# Patient Record
Sex: MALE | Race: BLACK OR AFRICAN AMERICAN | Employment: FULL TIME | URBAN - METROPOLITAN AREA
[De-identification: names, ages, dates, MRNs, and addresses within clinical notes are randomized per-mention and may not be internally consistent; named-entity substitution may affect disease eponyms.]

---

## 2021-06-04 ENCOUNTER — HOSPITAL ENCOUNTER (EMERGENCY)
Age: 36
Discharge: HOME | End: 2021-06-04
Attending: EMERGENCY MEDICINE

## 2021-06-04 VITALS
HEIGHT: 70 IN | DIASTOLIC BLOOD PRESSURE: 92 MMHG | BODY MASS INDEX: 45.1 KG/M2 | OXYGEN SATURATION: 96 % | HEART RATE: 98 BPM | WEIGHT: 315 LBS | RESPIRATION RATE: 16 BRPM | SYSTOLIC BLOOD PRESSURE: 165 MMHG | TEMPERATURE: 97.9 F

## 2021-06-04 DIAGNOSIS — N48.1 BALANITIS: Primary | ICD-10-CM

## 2021-06-04 DIAGNOSIS — E13.9 OTHER SPECIFIED DIABETES MELLITUS WITHOUT COMPLICATION, WITHOUT LONG-TERM CURRENT USE OF INSULIN (HCC): ICD-10-CM

## 2021-06-04 LAB
ANION GAP SERPL CALCULATED.3IONS-SCNC: 14 MMOL/L (ref 3–16)
BUN BLDV-MCNC: 8 MG/DL (ref 7–20)
CALCIUM SERPL-MCNC: 9.5 MG/DL (ref 8.3–10.6)
CHLORIDE BLD-SCNC: 95 MMOL/L (ref 99–110)
CO2: 22 MMOL/L (ref 21–32)
CREAT SERPL-MCNC: 0.7 MG/DL (ref 0.9–1.3)
GFR AFRICAN AMERICAN: >60
GFR NON-AFRICAN AMERICAN: >60
GLUCOSE BLD-MCNC: 548 MG/DL (ref 70–99)
POTASSIUM REFLEX MAGNESIUM: 4.9 MMOL/L (ref 3.5–5.1)
SODIUM BLD-SCNC: 131 MMOL/L (ref 136–145)

## 2021-06-04 PROCEDURE — 80048 BASIC METABOLIC PNL TOTAL CA: CPT

## 2021-06-04 PROCEDURE — 99283 EMERGENCY DEPT VISIT LOW MDM: CPT

## 2021-06-04 PROCEDURE — 36415 COLL VENOUS BLD VENIPUNCTURE: CPT

## 2021-06-04 RX ORDER — CLOTRIMAZOLE 1 %
CREAM (GRAM) TOPICAL
Qty: 1 TUBE | Refills: 1 | Status: SHIPPED | OUTPATIENT
Start: 2021-06-04 | End: 2021-06-11

## 2021-06-04 ASSESSMENT — PAIN DESCRIPTION - FREQUENCY: FREQUENCY: INTERMITTENT

## 2021-06-04 ASSESSMENT — ENCOUNTER SYMPTOMS
NAUSEA: 0
COUGH: 0
ABDOMINAL PAIN: 0
VOMITING: 0
BACK PAIN: 0
SHORTNESS OF BREATH: 0

## 2021-06-04 ASSESSMENT — PAIN DESCRIPTION - ONSET: ONSET: GRADUAL

## 2021-06-04 ASSESSMENT — PAIN DESCRIPTION - PAIN TYPE: TYPE: ACUTE PAIN

## 2021-06-04 ASSESSMENT — PAIN DESCRIPTION - DESCRIPTORS: DESCRIPTORS: BURNING

## 2021-06-04 ASSESSMENT — PAIN DESCRIPTION - LOCATION: LOCATION: PENIS;GROIN

## 2021-06-04 ASSESSMENT — PAIN SCALES - GENERAL: PAINLEVEL_OUTOF10: 2

## 2021-06-04 NOTE — ED PROVIDER NOTES
[address] [facility] [address] ENCOUNTER          PHYSICIAN ASSISTANT NOTE       Date of evaluation: 6/4/2021    Chief Complaint     Groin Swelling (Swelling in foreskin from skin irritation )      History of Present Illness     Elpidio Gay is a 43 y.o. male who presents to the emergency department due to penile discomfort. Patient states over the last 2 to 3 weeks he has had swelling noted throughout the distal portion of his foreskin. He states initially the swelling was significant, however it is much improved. He states he has been using antibacterial soap twice daily and triple antibiotic ointment for cracks in his skin. He presented to the emergency room today because he is unable to get all the swelling to go away. He is able to retract his foreskin fully without significant difficulty. He states prior to the swelling he was sexually active with his wife. Denies penile discharge, testicular pain, testicular swelling, or any concern for sexually transmitted infection. He denies abdominal pain, nausea, vomiting, or fevers. Patient states that he was told he was prediabetic several years ago, however has not followed up with his primary care provider in approximately 2 years. Review of Systems     Review of Systems   Constitutional: Negative for activity change, chills and fever. HENT: Negative for congestion. Eyes: Negative for visual disturbance. Respiratory: Negative for cough and shortness of breath. Cardiovascular: Negative for chest pain. Gastrointestinal: Negative for abdominal pain, nausea and vomiting. Genitourinary: Positive for penile pain and penile swelling. Negative for difficulty urinating, discharge, scrotal swelling and testicular pain. Musculoskeletal: Negative for back pain. Neurological: Negative for headaches. Psychiatric/Behavioral: Negative for suicidal ideas.        Past Medical, Surgical, Family, and Social History     He has a past medical history of Asthma. He has a past surgical history that includes other surgical history (3/17/2015) and Tonsillectomy. His family history is not on file. He reports that he has been smoking. He does not have any smokeless tobacco history on file. He reports current alcohol use of about 3.0 standard drinks of alcohol per week. He reports current drug use. Drug: Marijuana. Medications     Previous Medications    ACETAMINOPHEN (TYLENOL) 325 MG TABLET    Take 650 mg by mouth every 6 hours as needed for Pain. Allergies     He has No Known Allergies. Physical Exam     INITIAL VITALS: BP: (!) 165/92, Temp: 97.9 °F (36.6 °C), Pulse: 98, Resp: 16, SpO2: 96 %  Physical Exam  Constitutional:       Appearance: Normal appearance. HENT:      Head: Normocephalic and atraumatic. Eyes:      Pupils: Pupils are equal, round, and reactive to light. Cardiovascular:      Rate and Rhythm: Normal rate and regular rhythm. Pulmonary:      Effort: Pulmonary effort is normal.      Breath sounds: Normal breath sounds. Abdominal:      General: There is no distension. Palpations: Abdomen is soft. Tenderness: There is no abdominal tenderness. Genitourinary:     Comments:  exam performed with Dr. Zee Hunter as a chaperone. Patient does have swelling noted throughout the distal foreskin and slumped to the glans of penis. He is able to retract the foreskin without difficulty. No signs of phimosis or paraphimosis. Patient does have some white, curd-like lesions. No penile discharge. No testicular tenderness or swelling. Musculoskeletal:         General: Normal range of motion. Cervical back: Normal range of motion and neck supple. Skin:     General: Skin is warm and dry. Neurological:      General: No focal deficit present. Mental Status: He is alert and oriented to person, place, and time.    Psychiatric:         Mood and Affect: Mood normal.         Behavior: Behavior normal. Diagnostic Results     RADIOLOGY:  No orders to display       LABS:   Results for orders placed or performed during the hospital encounter of 65/10/72   Basic Metabolic Panel w/ Reflex to MG   Result Value Ref Range    Sodium 131 (L) 136 - 145 mmol/L    Potassium reflex Magnesium 4.9 3.5 - 5.1 mmol/L    Chloride 95 (L) 99 - 110 mmol/L    CO2 22 21 - 32 mmol/L    Anion Gap 14 3 - 16    Glucose 548 (H) 70 - 99 mg/dL    BUN 8 7 - 20 mg/dL    CREATININE 0.7 (L) 0.9 - 1.3 mg/dL    GFR Non-African American >60 >60    GFR African American >60 >60    Calcium 9.5 8.3 - 10.6 mg/dL       RECENT VITALS:  BP: (!) 165/92, Temp: 97.9 °F (36.6 °C), Pulse: 98, Resp: 16, SpO2: 96 %       ED Course     Nursing Notes, Past Medical Hx,Past Surgical Hx, Social Hx, Allergies, and Family Hx were reviewed. The patient was given the following medications:  Orders Placed This Encounter   Medications    metFORMIN (GLUCOPHAGE) 500 MG tablet     Sig: Take 1 tablet by mouth 2 times daily (with meals)     Dispense:  60 tablet     Refill:  0    clotrimazole (LOTRIMIN) 1 % cream     Sig: Apply topically 2 times daily to lesions on skin. Dispense:  1 Tube     Refill:  1       CONSULTS:  None    MEDICAL DECISION MAKING / ASSESSMENT / Ashanti Saavedra is a 43 y.o. male who presents the emergency department with penile pain. Vital signs stable on presentation remained stable throughout his stay. Thorough history and physical exam was performed in detail above. Patient presents the emergency department with several weeks of penile pain and swelling. He states his symptoms have improved, however he is unable to get the swelling to fully go away. He states he has been using antibiotic soap twice daily along with triple antibiotic ointment for skin breaks. He was sexually active with his partner prior to symptom onset, however has not been since.   Denies penile discharge, testicular pain, testicular swelling, or any concern for sexually transmitted infection. Denies abdominal pain, nausea, vomiting, or fevers. On physical exam patient does have swelling noted throughout the distal portion of his foreskin and some to the glans of his penis. Several small curd-like areas noted. Patient is able to retract his foreskin without difficulty. No signs of phimosis or paraphimosis. No testicular tenderness palpation. No penile discharge. BMP was obtained due to concern for diabetes which showed a significantly elevated blood glucose to 548. Anion gap within normal limits. No other significant abnormalities. Low suspicion for DKA or other emergent problem at this time. I did have a long discussion with the patient about his new diagnosis of diabetes. He will be started on Metformin 500mg BID and given an urgent referral to the [facility] medical [facility]. Patient was counseled on healthy diet and lifestyle choices. At this time, I do believe patient likely has a candidal infection causing his swelling and irritation. He will be given a prescription for Clotrimazole 1% to use twice daily along with urology follow-up. Patient was also given contact information for the [address] [place] [address] clinic as he has not been able to follow-up with his primary care provider in several years. Plan was thoroughly discussed with the patient who is agreeable at this time. He was given strict return precautions prior to discharge. This patient was also evaluated by the attending physician. All care plans were discussed and agreed upon. Clinical Impression     1. Balanitis    2.  Other specified diabetes mellitus without complication, without long-term current use of insulin [facility])        Disposition     PATIENT REFERRED TO:  [facility] Emergency Department  [address] [place] [address]  [phone]  Go to   If symptoms worsen    Lani Narayanan MD  [address] [place] [address]  [phone]    Schedule an appointment as soon as possible for a visit         DISCHARGE MEDICATIONS:  New Prescriptions    CLOTRIMAZOLE (LOTRIMIN) 1 % CREAM    Apply topically 2 times daily to lesions on skin.     METFORMIN (GLUCOPHAGE) 500 MG TABLET    Take 1 tablet by mouth 2 times daily (with meals)       DISPOSITION  discharge          Niharika Welsh PA-C  06/04/21 1037

## 2021-06-04 NOTE — ED PROVIDER NOTES
ED Attending Attestation Note     Date of evaluation: 6/4/2021    This patient was seen by the advance practice provider. I have seen and examined the patient, agree with the workup, evaluation, management and diagnosis. The care plan has been discussed. My assessment reveals presents to the ED because of penile pain. Patient is uncircumcised. He does have soft tissue edema of foreskin and glans. He does not have a phimosis or paraphimosis. Able to retract glans from foreskin without difficulty but does have discomfort and some mild soft tissue swelling. Questionable history of diabetes. Plan is to check his blood sugar and electrolytes in treat with antifungals refer to urology.      Uyen Barrios MD  06/04/21 2374

## 2021-09-05 ENCOUNTER — HOSPITAL ENCOUNTER (EMERGENCY)
Age: 36
Discharge: HOME | End: 2021-09-05

## 2021-09-05 VITALS
OXYGEN SATURATION: 97 % | TEMPERATURE: 97.3 F | HEART RATE: 82 BPM | RESPIRATION RATE: 20 BRPM | DIASTOLIC BLOOD PRESSURE: 88 MMHG | SYSTOLIC BLOOD PRESSURE: 141 MMHG

## 2021-09-05 DIAGNOSIS — N48.89 PENILE IRRITATION: Primary | ICD-10-CM

## 2021-09-05 DIAGNOSIS — E11.9 TYPE 2 DIABETES MELLITUS WITHOUT COMPLICATION, WITHOUT LONG-TERM CURRENT USE OF INSULIN (HCC): ICD-10-CM

## 2021-09-05 LAB
GLUCOSE BLD-MCNC: 310 MG/DL (ref 70–99)
PERFORMED ON: ABNORMAL

## 2021-09-05 PROCEDURE — 99282 EMERGENCY DEPT VISIT SF MDM: CPT

## 2021-09-05 RX ORDER — BACITRACIN ZINC AND POLYMYXIN B SULFATE 500; 1000 [USP'U]/G; [USP'U]/G
OINTMENT TOPICAL
Qty: 15 G | Refills: 1 | Status: SHIPPED | OUTPATIENT
Start: 2021-09-05 | End: 2021-09-12

## 2021-09-05 ASSESSMENT — PAIN DESCRIPTION - LOCATION: LOCATION: PENIS

## 2021-09-05 ASSESSMENT — PAIN SCALES - GENERAL: PAINLEVEL_OUTOF10: 3

## 2021-09-05 ASSESSMENT — PAIN DESCRIPTION - ONSET: ONSET: ON-GOING

## 2021-09-05 ASSESSMENT — ENCOUNTER SYMPTOMS
CHEST TIGHTNESS: 0
VOMITING: 0
NAUSEA: 0
DIARRHEA: 0
SHORTNESS OF BREATH: 0
ABDOMINAL PAIN: 0
CONSTIPATION: 0

## 2021-09-05 ASSESSMENT — PAIN - FUNCTIONAL ASSESSMENT: PAIN_FUNCTIONAL_ASSESSMENT: PREVENTS OR INTERFERES WITH MANY ACTIVE NOT PASSIVE ACTIVITIES

## 2021-09-05 ASSESSMENT — PAIN DESCRIPTION - FREQUENCY: FREQUENCY: CONTINUOUS

## 2021-09-05 ASSESSMENT — PAIN DESCRIPTION - PAIN TYPE: TYPE: ACUTE PAIN

## 2021-09-05 ASSESSMENT — PAIN SCALES - WONG BAKER: WONGBAKER_NUMERICALRESPONSE: 4

## 2021-09-05 ASSESSMENT — PAIN DESCRIPTION - DESCRIPTORS: DESCRIPTORS: CONSTANT

## 2021-09-05 ASSESSMENT — PAIN DESCRIPTION - PROGRESSION: CLINICAL_PROGRESSION: NOT CHANGED

## 2021-09-05 NOTE — ED PROVIDER NOTES
[address] [facility] ENC[facility]          PHYSICIAN ASSISTANT NOTE       Date of evaluation: 9/5/2021    Chief Complaint     Wound Check (areas on penis keep breaking open)      History of Present Illness     Lucía Farfan is a 43 y.o. male who presents to the emergency department with excoriations to the penile foreskin. The patient is uncircumcised and states that he has been battling with excoriations to the skin for the last few months. He was seen here in June secondary to a candidal infection and prescribed fungal cream at that time. He states that has improved however he continues to have excoriations to the foreskin. He states he has been using antibiotic ointment and the areas will heal however once dry they crack again. At the time of his last visit he was prescribed Metformin which he took until the prescription ran out and has not taken any medication for diabetes since. He is a known diabetic, noncompliant and currently does not have a primary care physician. He denies fevers or chills. Denies abdominal pain, nausea or vomiting. Denies dysuria, hematuria, urinary frequency, penile discharge, testicular pain or swelling. Review of Systems     Review of Systems   Constitutional: Negative for chills and fever. HENT: Negative. Respiratory: Negative for chest tightness and shortness of breath. Cardiovascular: Negative. Gastrointestinal: Negative for abdominal pain, constipation, diarrhea, nausea and vomiting. Genitourinary: Positive for penile pain. Negative for decreased urine volume, difficulty urinating, discharge, dysuria, flank pain, frequency, hematuria, penile swelling, scrotal swelling, testicular pain and urgency. Musculoskeletal: Negative. Skin: Positive for wound. Positive for penile excoriations     Neurological: Negative. Negative for dizziness, weakness, light-headedness and headaches. Psychiatric/Behavioral: Negative. Past Medical, Surgical, Family, and Social History     He has a past medical history of Asthma and Diabetes mellitus ([facility] He has a past surgical history that includes other surgical history (3/17/2015) and Tonsillectomy. His family history is not on file. He reports that he has been smoking. He has never used smokeless tobacco. He reports current alcohol use of about 3.0 standard drinks of alcohol per week. He reports current drug use. Drug: Marijuana. Medications     Previous Medications    ACETAMINOPHEN (TYLENOL) 325 MG TABLET    Take 650 mg by mouth every 6 hours as needed for Pain. Allergies     He has No Known Allergies. Physical Exam     INITIAL VITALS: BP: (!) 141/88, Temp: 97.3 °F (36.3 °C), Pulse: 82, Resp: 20, SpO2: 97 %  Physical Exam  Vitals and nursing note reviewed. Constitutional:       General: He is not in acute distress. Appearance: He is well-developed. HENT:      Head: Normocephalic and atraumatic. Right Ear: External ear normal.      Left Ear: External ear normal.      Nose: Nose normal.      Mouth/Throat:      Mouth: Mucous membranes are moist.   Eyes:      General:         Right eye: No discharge. Left eye: No discharge. Extraocular Movements: Extraocular movements intact. Conjunctiva/sclera: Conjunctivae normal.      Pupils: Pupils are equal, round, and reactive to light. Pulmonary:      Effort: Pulmonary effort is normal.   Abdominal:      General: Bowel sounds are normal.      Palpations: Abdomen is soft. Tenderness: There is no abdominal tenderness. There is no guarding or rebound. Genitourinary:     Comments: On examination he is an uncircumcised male with no discharge from the urethral meatus. There is no evidence of candidal infection after retracting the foreskin. He does however have some excoriations to the foreskin. There is no bleeding. No surrounding erythema or purulent drainage.   The foreskin itself is somewhat moist.  No testicular pain or swelling on exam.  No inguinal lymphadenopathy. Musculoskeletal:         General: Normal range of motion. Cervical back: Normal range of motion and neck supple. Skin:     General: Skin is warm and dry. Capillary Refill: Capillary refill takes less than 2 seconds. Neurological:      General: No focal deficit present. Mental Status: He is alert and oriented to person, place, and time. Sensory: No sensory deficit. Deep Tendon Reflexes: Reflexes normal.   Psychiatric:         Mood and Affect: Mood normal.         Behavior: Behavior normal.         Thought Content: Thought content normal.         Judgment: Judgment normal.         Diagnostic Results       RADIOLOGY:  No orders to display       LABS:   Results for orders placed or performed during the hospital encounter of 09/05/21   POCT Glucose   Result Value Ref Range    POC Glucose 310 (H) 70 - 99 mg/dl    Performed on ACCU-CHEK            RECENT VITALS:  BP: (!) 141/88, Temp: 97.3 °F (36.3 °C), Pulse: 82, Resp: 20, SpO2: 97 %     Procedures         ED Course     Nursing Notes, Past Medical Hx,Past Surgical Hx, Social Hx, Allergies, and Family Hx were reviewed. The patient was given the following medications:  Orders Placed This Encounter   Medications    metFORMIN (GLUCOPHAGE) 500 MG tablet     Sig: Take 1 tablet by mouth 2 times daily (with meals)     Dispense:  60 tablet     Refill:  1    bacitracin-polymyxin b (POLYSPORIN) [id number] UNIT/GM ointment     Sig: Apply topically 2 times daily. Dispense:  15 g     Refill:  1       CONSULTS:  None    MEDICAL DECISION MAKING / ASSESSMENT / Susan Olesya is a 43 y.o. male who presented to the emergency department with excoriations to the foreskin of the penis. On examination there is no evidence of secondary infection. No evidence of remaining candidal infection.   The skin gain continues to crack most likely because it is staying too moist.  The patient was encouraged to keep the area clean but also keep it dry. He can continue to apply an antibiotic ointment. His glucose here was 310. He denies any DKA type symptoms. He will be given a prescription for his Metformin with 1 refill as well as a referral to the [facility] clinic. He was given the number to urology as well and asked to follow-up if this is been an ongoing issue. I did have a long discussion with the patient that this may continue to happen and not heal if his glucose continues to remain high from noncompliance. He is to return to the emergency department for worsening symptoms or concerns. This patient was seen independently by the advanced practice provider    Clinical Impression     1. Penile irritation    2. Type 2 diabetes mellitus without complication, without long-term current use of insulin [facility])        Disposition     PATIENT REFERRED TO:  [facility] [address] [facility] MD Gavi Alfaro 1967  [facility]  [facility] [id number] [phone]            DISCHARGE MEDICATIONS:  New Prescriptions    BACITRACIN-POLYMYXIN B (POLYSPORIN) [id number] UNIT/GM OINTMENT    Apply topically 2 times daily.        DISPOSITION Decision To Discharge 09/05/2021 09:41:22 AM     REMEDIOS Das  09/05/21 1010

## 2021-09-08 ENCOUNTER — NURSE TRIAGE (OUTPATIENT)
Dept: OTHER | Facility: CLINIC | Age: 36
End: 2021-09-08

## 2021-09-08 NOTE — TELEPHONE ENCOUNTER
Received call from Isaac at [facility] with Red Flag Complaint. Brief description of triage: no triage completed. Calling for appt after ED visit today. Triage indicates for patient to no triage completed. Care advice provided, patient verbalizes understanding; denies any other questions or concerns; instructed to call back for any new or worsening symptoms. Writer provided warm transfer to [facility] at [facility] for appointment scheduling. Attention Provider: Thank you for allowing me to participate in the care of your patient. The patient was connected to triage in response to information provided to the ECC. Please do not respond through this encounter as the response is not directed to a shared pool. Reason for Disposition   Caller requesting an appointment, triage offered and declined    Answer Assessment - Initial Assessment Questions  1. REASON FOR CALL or QUESTION: \"What is your reason for calling today? \" or \"How can I best  help you? \" or \"What question do you have that I can help answer? \"     Caller just got out of ED and wants to schedule follow up appointment    2. CALLER: Document the source of call. (e.g., laboratory, patient).       patient    Protocols used: PCP CALL - NO TRIAGE-ADULT-

## 2021-09-09 ENCOUNTER — OFFICE VISIT (OUTPATIENT)
Dept: INTERNAL MEDICINE CLINIC | Age: 36
End: 2021-09-09

## 2021-09-09 VITALS
HEIGHT: 74 IN | OXYGEN SATURATION: 97 % | BODY MASS INDEX: 40.43 KG/M2 | SYSTOLIC BLOOD PRESSURE: 109 MMHG | DIASTOLIC BLOOD PRESSURE: 75 MMHG | WEIGHT: 315 LBS | TEMPERATURE: 98 F | HEART RATE: 80 BPM

## 2021-09-09 DIAGNOSIS — E11.9 TYPE 2 DIABETES MELLITUS WITHOUT COMPLICATION, WITHOUT LONG-TERM CURRENT USE OF INSULIN (HCC): Primary | ICD-10-CM

## 2021-09-09 DIAGNOSIS — N48.9 PENILE LESION: ICD-10-CM

## 2021-09-09 DIAGNOSIS — E66.01 CLASS 3 SEVERE OBESITY DUE TO EXCESS CALORIES WITH BODY MASS INDEX (BMI) OF 40.0 TO 44.9 IN ADULT, UNSPECIFIED WHETHER SERIOUS COMORBIDITY PRESENT (HCC): ICD-10-CM

## 2021-09-09 LAB — HBA1C MFR BLD: 13.1 %

## 2021-09-09 PROCEDURE — 83036 HEMOGLOBIN GLYCOSYLATED A1C: CPT

## 2021-09-09 PROCEDURE — 99213 OFFICE O/P EST LOW 20 MIN: CPT | Performed by: STUDENT IN AN ORGANIZED HEALTH CARE EDUCATION/TRAINING PROGRAM

## 2021-09-09 ASSESSMENT — ENCOUNTER SYMPTOMS
CONSTIPATION: 0
DIARRHEA: 0
ABDOMINAL PAIN: 0
BACK PAIN: 1
SHORTNESS OF BREATH: 0

## 2021-09-09 ASSESSMENT — PATIENT HEALTH QUESTIONNAIRE - PHQ9
SUM OF ALL RESPONSES TO PHQ QUESTIONS 1-9: 0
SUM OF ALL RESPONSES TO PHQ9 QUESTIONS 1 & 2: 0
SUM OF ALL RESPONSES TO PHQ QUESTIONS 1-9: 0
SUM OF ALL RESPONSES TO PHQ QUESTIONS 1-9: 0
1. LITTLE INTEREST OR PLEASURE IN DOING THINGS: 0
2. FEELING DOWN, DEPRESSED OR HOPELESS: 0

## 2021-09-09 NOTE — PATIENT INSTRUCTIONS
You have diabetes and your HgA1c is elevated to 13  - start taking metformin 1000 mg twice a day with meals   - apply miconazole to penile lesions   - apply to insurance with welfare    - get urinalysis done to check for protein or infection   - try make lifestyle changes; including eating less carbs (potatoes, pizza, bread) and exercise daily   - get [facility] give your diabetes    - f/u in 1 month

## 2021-09-09 NOTE — PROGRESS NOTES
times daily. Yes Princess To MD   bacitracin-polymyxin b (POLYSPORIN) [id number] UNIT/GM ointment Apply topically 2 times daily. Yes Eve Gomez, [address]       Allergies:    Patient has no known allergies. Family History:   No family history on file. Review of Systems   Constitutional: Negative for fatigue and fever. Respiratory: Negative for shortness of breath. Cardiovascular: Negative for chest pain, palpitations and leg swelling. Gastrointestinal: Negative for abdominal pain, constipation and diarrhea. Endocrine: Positive for polydipsia and polyuria. Genitourinary: Positive for genital sores. Negative for discharge, dysuria, penile pain, penile swelling and testicular pain. Musculoskeletal: Positive for back pain. A 10-organ Review Of Systems was obtained and otherwise unremarkable except as per HPI. There is no immunization history on file for this patient. Health Maintenance Due   Topic Date Due    Hepatitis C screen  Never done    Varicella vaccine (1 of 2 - 2-dose childhood series) Never done    Pneumococcal 0-64 years Vaccine (1 of 2 - PPSV23) Never done    COVID-19 Vaccine (1) Never done    HIV screen  Never done    Hepatitis B vaccine (1 of 3 - Risk 3-dose series) Never done    Flu vaccine (1) Never done       Data: Old records have been reviewed electronically. PHYSICAL EXAM:  /75 (Site: Right Upper Arm, Position: Sitting, Cuff Size: Medium Adult)   Pulse 80   Temp 98 °F (36.7 °C) (Temporal)   Ht 6' 2\" (1.88 m)   Wt (!) 332 lb 12.8 oz (151 kg)   SpO2 97%   BMI 42.73 kg/m²   Physical Exam  Constitutional:       Appearance: He is obese. HENT:      Mouth/Throat:      Mouth: Mucous membranes are moist.   Cardiovascular:      Rate and Rhythm: Normal rate and regular rhythm. Pulmonary:      Effort: Pulmonary effort is normal. No respiratory distress. Breath sounds: Normal breath sounds. No wheezing. Abdominal:      General: There is no distension. Palpations: Abdomen is soft. Tenderness: There is no abdominal tenderness. Musculoskeletal:      Right lower leg: No edema. Left lower leg: No edema. Neurological:      Mental Status: He is alert and oriented to person, place, and time. Assessment & Plan:      1. Type 2 diabetes mellitus without complication, without long-term current use of insulin (HCC)   in ED  - increased metformin to 100 mg BID   - discussed the diagnosis of diabetes and the risks associated with untreated diabetes and uncontrolled hyperglycemia with patient. Patient unwilling to use insulin or injectable medications. He does not want to try other oral hypoglycemics. - URINALYSIS WITH MICROSCOPIC to check for proteinuria  - POCT glycosylated hemoglobin (Hb A1C); 13.1%  - encouraged to apply for medicaid and go to welfare office if needs help with application. Provided good rx card to help with medication costs   - f/u in 1 month   - will readdress need for insulin use at next visit      2. Penile lesion  - continue with current therapy   - will provide miconazole cream in case of recurrence of fungal infection  - UA to r/o UTI    - discussed genial hygiene and keeping area dry      3. Class 3 severe obesity due to excess calories with body mass index (BMI) of 40.0 to 44.9 in adult, unspecified whether serious comorbidity present [facility])  - extensively discussed lifecycle modifications [facility] patient including healthy diet and exercise     Return in about 1 month (around 10/9/2021) for diabetes. Dispo: Pt has been staffed with Dr. Regino Sinha  _______________  Lilliana Mckee MD, 9/9/2021 4:02 PM   PGY-2    Addendum to Resident H& P/Progress note:  I have personally seen,examined and evaluated the patient.  I have reviewed the current history, physical findings, labs and assessment and plan and agree with note as documented by resident MD ( Ariadna Velazquez)      Angelica Avila MD, Mar Brantley

## 2021-10-18 ENCOUNTER — OFFICE VISIT (OUTPATIENT)
Dept: INTERNAL MEDICINE CLINIC | Age: 36
End: 2021-10-18

## 2021-10-18 VITALS
HEART RATE: 91 BPM | TEMPERATURE: 96.6 F | DIASTOLIC BLOOD PRESSURE: 86 MMHG | WEIGHT: 315 LBS | OXYGEN SATURATION: 97 % | SYSTOLIC BLOOD PRESSURE: 134 MMHG | BODY MASS INDEX: 40.43 KG/M2 | HEIGHT: 74 IN

## 2021-10-18 DIAGNOSIS — E66.01 MORBID OBESITY WITH BMI OF 45.0-49.9, ADULT (HCC): ICD-10-CM

## 2021-10-18 DIAGNOSIS — E11.9 TYPE 2 DIABETES MELLITUS WITHOUT COMPLICATION, WITHOUT LONG-TERM CURRENT USE OF INSULIN (HCC): Primary | ICD-10-CM

## 2021-10-18 PROCEDURE — 99213 OFFICE O/P EST LOW 20 MIN: CPT | Performed by: STUDENT IN AN ORGANIZED HEALTH CARE EDUCATION/TRAINING PROGRAM

## 2021-10-18 NOTE — PROGRESS NOTES
Outpatient Clinic Visit Note    Patient: Tiffanie Mckeon  : 1985 (43 y.o.)  Date: 10/18/2021    CC: DM F/u    HPI:      Mr. Radha Carcamo presents today for follow up of his diabetes mellitus. He has not been compliant with diet or medications due to stress from his previous miscarriage, death of his grandmother. Last A1c was 13.1% last month. He has not been taking his metformin or checking blood sugar at home. He is overwhelmed by everything else going on in his life at the moment. He is not interested in taking diabetic testing supplies home today. He is not interested in signing up for the diabetes education class. He feels that this is an insurmountable problem that will not ever change. He overall seems depressed, but denies suicidal ideation. He is not interested in taking medication for anxiety or depression and claims it would probably make him worse. I am very concerned for his prognosis over the next few years. His interpretation of the diabetes problems as unfixable will likely render it so. All resources were printed for the patient on how to apply for insurance with SSI, as this was quoted as a reason for non-adherence to medications at the previous visit. He was also given a referral to [facility] for his obesity, though I suspect it is unlikely he jose follow up. He should RTC in 4 weeks. Denies any fevers, chills, chest pain, palpitations, leg swelling, cough, shortness of breath, difficulty breathing, wheezing, abdominal pain, nausea, vomiting, diarrhea.       Past Medical History:    Past Medical History:   Diagnosis Date    Asthma     Diabetes mellitus ([facility] [address]        Past Surgical History:  Past Surgical History:   Procedure Laterality Date    OTHER SURGICAL HISTORY  3/17/2015    INCISION AND DRAINAGE SCROTAL ABSCESS     TONSILLECTOMY         Past Social History:  Social History     Socioeconomic History    Marital status: Single     Spouse name: Not on file    Number of children: Not on file    Years of education: Not on file    Highest education level: Not on file   Occupational History    Not on file   Tobacco Use    Smoking status: Current Some Day Smoker    Smokeless tobacco: Never Used   Substance and Sexual Activity    Alcohol use: Yes     Alcohol/week: 3.0 standard drinks     Types: 3 Standard drinks or equivalent per week     Comment: socially    Drug use: Yes     Types: Marijuana    Sexual activity: Yes     Partners: Female   Other Topics Concern    Not on file   Social History Narrative    Not on file     Social Determinants of Health     Financial Resource Strain:     Difficulty of Paying Living Expenses:    Food Insecurity:     Worried About Running Out of Food in the Last Year:     [address] in the Last Year:    Transportation Needs:     Lack of Transportation (Medical):  Lack of Transportation (Non-Medical):    Physical Activity:     Days of Exercise per Week:     Minutes of Exercise per Session:    Stress:     Feeling of Stress :    Social Connections:     Frequency of Communication with Friends and Family:     Frequency of Social Gatherings with Friends and Family:     Attends Jainism Services:     Active Member of Clubs or Organizations:     Attends Club or Organization Meetings:     Marital Status:    Intimate Partner Violence:     Fear of Current or Ex-Partner:     Emotionally Abused:     Physically Abused:     Sexually Abused:        Home Medications:  Current Outpatient Medications   Medication Sig Dispense Refill    metFORMIN (GLUCOPHAGE) 1000 MG tablet Take 1 tablet by mouth 2 times daily (with meals) 60 tablet 2    miconazole (MICONAZOLE ANTIFUNGAL) 2 % cream Apply topically 2 times daily. (Patient not taking: Reported on 10/18/2021) 1 each 1     No current facility-administered medications for this visit. Allergies:    Patient has no known allergies.     Family History:   No family history on file.    ROS: A 10-organ Review Of Systems was obtained and otherwise unremarkable except as per HPI. PHYSICAL EXAM:  /86 (Site: Left Upper Arm, Position: Sitting, Cuff Size: Large Adult)   Pulse 91   Temp 96.6 °F (35.9 °C) (Temporal)   Ht 6' 2\" (1.88 m)   Wt (!) 354 lb 3.2 oz (160.7 kg)   SpO2 97%   BMI 45.48 kg/m²   Physical Exam  Vitals and nursing note reviewed. Constitutional:       General: He is not in acute distress. Appearance: Normal appearance. He is obese. He is not ill-appearing or toxic-appearing. HENT:      Head: Normocephalic and atraumatic. Right Ear: External ear normal.      Left Ear: External ear normal.      Nose: Nose normal.      Mouth/Throat:      Mouth: Mucous membranes are moist.      Pharynx: Oropharynx is clear. Eyes:      General: No scleral icterus. Conjunctiva/sclera: Conjunctivae normal.      Pupils: Pupils are equal, round, and reactive to light. Cardiovascular:      Rate and Rhythm: Normal rate and regular rhythm. Pulses: Normal pulses. Heart sounds: No murmur heard. Pulmonary:      Effort: Pulmonary effort is normal. No respiratory distress. Breath sounds: Normal breath sounds. No stridor. No wheezing, rhonchi or rales. Abdominal:      General: Abdomen is flat. Bowel sounds are normal. There is no distension. Palpations: Abdomen is soft. Tenderness: There is no abdominal tenderness. There is no guarding or rebound. Musculoskeletal:         General: No swelling. Normal range of motion. Cervical back: Normal range of motion and neck supple. No rigidity. Lymphadenopathy:      Cervical: No cervical adenopathy. Skin:     General: Skin is warm and dry. Capillary Refill: Capillary refill takes less than 2 seconds. Coloration: Skin is not jaundiced or pale. Findings: No bruising.       Comments: Acanthosis nigricans present on the posterior neck, about the eyes   Neurological:      General: No focal deficit present. Mental Status: He is alert and oriented to person, place, and time. Cranial Nerves: No cranial nerve deficit. Sensory: No sensory deficit. Motor: No weakness. Gait: Gait normal.   Psychiatric:         Mood and Affect: Mood normal.         Behavior: Behavior normal.         Thought Content: Thought content normal.         Judgment: Judgment normal.         Health Maintenance Due   Topic Date Due    Hepatitis C screen  Never done    Varicella vaccine (1 of 2 - 2-dose childhood series) Never done    Pneumococcal 0-64 years Vaccine (1 of 2 - PPSV23) Never done    Diabetic retinal exam  Never done    Lipid screen  Never done    COVID-19 Vaccine (1) Never done    HIV screen  Never done    Diabetic microalbuminuria test  Never done    Hepatitis B vaccine (1 of 3 - Risk 3-dose series) Never done        Assessment & Plan:      1. Type 2 diabetes mellitus without complication, without long-term current use of insulin ([facility])  - Last A1c was 13.1% last month. He has not been taking his metformin or checking blood sugar at home. He is overwhelmed by everything else going on in his life at the moment. He is not interested in taking diabetic testing supplies home today. He is not interested in signing up for the diabetes education class. He feels that this is an insurmountable problem that will not ever change. - MICROALBUMIN / CREATININE URINE RATIO; Future    2. Morbid obesity with BMI of 45.0-49.9, adult ([facility] [address]  - as above, patient is overwhelmed and unable to address his medical issues with reliable adherence. It is unlikely he will follow up with Bariatrics, but the resources were provided.    - Linda Whitt DO, Bariatric Surgery, [facility]    Dispo: Pt has been staffed with Dr. Rosa Nam.  _______________  William Allen DO, 10/18/2021 3:43 PM   PGY-3

## 2022-01-25 ENCOUNTER — HOSPITAL ENCOUNTER (EMERGENCY)
Age: 37
Discharge: HOME | End: 2022-01-25
Attending: EMERGENCY MEDICINE

## 2022-01-25 VITALS
RESPIRATION RATE: 15 BRPM | TEMPERATURE: 97.6 F | SYSTOLIC BLOOD PRESSURE: 158 MMHG | OXYGEN SATURATION: 95 % | HEART RATE: 60 BPM | DIASTOLIC BLOOD PRESSURE: 95 MMHG

## 2022-01-25 DIAGNOSIS — R36.9 ABNORMAL PENILE DISCHARGE: Primary | ICD-10-CM

## 2022-01-25 LAB
BACTERIA: ABNORMAL /HPF
BILIRUBIN URINE: NEGATIVE
BLOOD, URINE: NEGATIVE
CLARITY: CLEAR
COLOR: YELLOW
EPITHELIAL CELLS, UA: ABNORMAL /HPF (ref 0–5)
GLUCOSE BLD-MCNC: 95 MG/DL (ref 70–99)
GLUCOSE URINE: NEGATIVE MG/DL
HYALINE CASTS: ABNORMAL /LPF (ref 0–2)
KETONES, URINE: NEGATIVE MG/DL
LEUKOCYTE ESTERASE, URINE: ABNORMAL
MICROSCOPIC EXAMINATION: YES
MUCUS: ABNORMAL /LPF
NITRITE, URINE: NEGATIVE
PERFORMED ON: NORMAL
PH UA: 6 (ref 5–8)
PROTEIN UA: NEGATIVE MG/DL
RBC UA: ABNORMAL /HPF (ref 0–4)
SPECIFIC GRAVITY UA: >=1.03 (ref 1–1.03)
URINE REFLEX TO CULTURE: ABNORMAL
URINE TYPE: ABNORMAL
UROBILINOGEN, URINE: 0.2 E.U./DL
WBC UA: ABNORMAL /HPF (ref 0–5)

## 2022-01-25 PROCEDURE — 6360000002 HC RX W HCPCS: Performed by: STUDENT IN AN ORGANIZED HEALTH CARE EDUCATION/TRAINING PROGRAM

## 2022-01-25 PROCEDURE — 87491 CHLMYD TRACH DNA AMP PROBE: CPT

## 2022-01-25 PROCEDURE — 0065U SYFLS TST NONTREPONEMAL ANTB: CPT

## 2022-01-25 PROCEDURE — 86701 HIV-1ANTIBODY: CPT

## 2022-01-25 PROCEDURE — 99283 EMERGENCY DEPT VISIT LOW MDM: CPT

## 2022-01-25 PROCEDURE — 86592 SYPHILIS TEST NON-TREP QUAL: CPT

## 2022-01-25 PROCEDURE — 81001 URINALYSIS AUTO W/SCOPE: CPT

## 2022-01-25 PROCEDURE — 86702 HIV-2 ANTIBODY: CPT

## 2022-01-25 PROCEDURE — 83036 HEMOGLOBIN GLYCOSYLATED A1C: CPT

## 2022-01-25 PROCEDURE — 2500000003 HC RX 250 WO HCPCS: Performed by: STUDENT IN AN ORGANIZED HEALTH CARE EDUCATION/TRAINING PROGRAM

## 2022-01-25 PROCEDURE — 87591 N.GONORRHOEAE DNA AMP PROB: CPT

## 2022-01-25 PROCEDURE — 96372 THER/PROPH/DIAG INJ SC/IM: CPT

## 2022-01-25 PROCEDURE — 6370000000 HC RX 637 (ALT 250 FOR IP): Performed by: STUDENT IN AN ORGANIZED HEALTH CARE EDUCATION/TRAINING PROGRAM

## 2022-01-25 PROCEDURE — 87390 HIV-1 AG IA: CPT

## 2022-01-25 RX ORDER — AZITHROMYCIN 250 MG/1
1000 TABLET, FILM COATED ORAL ONCE
Status: COMPLETED | OUTPATIENT
Start: 2022-01-25 | End: 2022-01-25

## 2022-01-25 RX ADMIN — LIDOCAINE HYDROCHLORIDE 500 MG: 10 INJECTION, SOLUTION EPIDURAL; INFILTRATION; INTRACAUDAL; PERINEURAL at 10:46

## 2022-01-25 RX ADMIN — AZITHROMYCIN MONOHYDRATE 1000 MG: 250 TABLET ORAL at 10:46

## 2022-01-25 ASSESSMENT — ENCOUNTER SYMPTOMS
COUGH: 0
CHEST TIGHTNESS: 0
SHORTNESS OF BREATH: 0
ABDOMINAL PAIN: 0
DIARRHEA: 0
SORE THROAT: 0
RHINORRHEA: 0
GASTROINTESTINAL NEGATIVE: 1
NAUSEA: 0
SINUS PRESSURE: 0
WHEEZING: 0
EYES NEGATIVE: 1
VOMITING: 0
RESPIRATORY NEGATIVE: 1
EYE DISCHARGE: 0
CONSTIPATION: 0

## 2022-01-25 NOTE — ED PROVIDER NOTES
ED Attending Attestation Note     Date of evaluation: 1/25/2022    This patient was seen by the resident. I have seen and examined the patient, agree with the workup, evaluation, management and diagnosis. The care plan has been discussed. My assessment reveals a 25-year-old male who presents with a chief complaint of blood sugar problem, other. Patient is concerned about several things including needing a blood sugar check, does not have glucometer at home, but also worried about penile discharge, concerned about STD. Per resident,  exam normal.  Patient well-appearing in the room. Follows with [facility] [address] clinic.      Yasir Patel MD  01/25/22 [id number]

## 2022-01-25 NOTE — ED PROVIDER NOTES
[id number] RESIDENT NOTE       Date of evaluation: 1/25/2022    Chief Complaint     Blood Sugar Problem (needs BS checked, does not have meter at home, intermittent headache for two days, takes metformin) and Other (\"bacteria in urine\", intermittent discharge, -dysuria)      History of Present Illness     Valentin Claros is a 43 y.o. male who presents with a 1-1/2-week history of penile discharge. Patient states that he has been monogamous with his girlfriend for 6 years and then had a fallout. He tried fixing things in 1 that did not work he got angry and had sex with another woman. Patient states that a day after having unprotected coitus he began having this whitish/light-yellowish penile discharge. Denies dysuria like prior STDs, however, the discharge is similar. Denies urinary urgency/frequency. Denies any penile ulcers/lesions, lesions or rashes elsewhere on the body, testicular pain/swelling. He states that he does not know and is able to contact his sexual partner if need be to get treated. He states he asked the partner who denied any STDs, however, the patient believes she could be lying. Patient also endorses occasional lightheadedness when he bends down. He otherwise denies any rhinorrhea, cough, shortness of breath, chest pain, headaches, fever/chills, nausea/vomiting, abdominal pain, diarrhea or constipation. Patient states that he has been compliant with his Metformin and has lost 40 to 50 pounds in the past year secondary to healthier dieting. He does not have a glucometer at home and and therefore does not check his blood sugars at all. His last HbA1c in September was 13.1. He follows with the internal medicine residency clinic. Review of Systems     Review of Systems   Constitutional: Negative. Negative for chills and fever. HENT: Negative.   Negative for congestion, postnasal drip, rhinorrhea, sinus pressure and sore throat. Eyes: Negative. Negative for discharge. Respiratory: Negative. Negative for cough, chest tightness, shortness of breath and wheezing. Cardiovascular: Negative. Negative for chest pain, palpitations and leg swelling. Gastrointestinal: Negative. Negative for abdominal pain, constipation, diarrhea, nausea and vomiting. Endocrine: Negative. Negative for polyuria. Genitourinary: Positive for penile discharge (White/light yellow). Negative for dysuria, frequency, genital sores, penile pain, penile swelling, scrotal swelling, testicular pain and urgency. Musculoskeletal: Negative. Skin: Negative. Negative for rash and wound. Neurological: Positive for light-headedness (Occasionally when he bends down). Negative for weakness and headaches. Psychiatric/Behavioral: Negative. Negative for self-injury and suicidal ideas. Past Medical, Surgical, Family, and Social History     He has a past medical history of Asthma and Diabetes mellitus ([facility] [address] He has a past surgical history that includes other surgical history (3/17/2015) and Tonsillectomy. His family history is not on file. He reports that he has been smoking. He has never used smokeless tobacco. He reports current alcohol use of about 3.0 standard drinks of alcohol per week. He reports current drug use. Drug: Marijuana Lyndel Remy). Medications     Previous Medications    METFORMIN (GLUCOPHAGE) 1000 MG TABLET    Take 1 tablet by mouth 2 times daily (with meals)    MICONAZOLE (MICONAZOLE ANTIFUNGAL) 2 % CREAM    Apply topically 2 times daily. Allergies     He has No Known Allergies. Physical Exam     INITIAL VITALS: BP: (!) 158/95, Temp: 97.6 °F (36.4 °C), Pulse: 60, Resp: 15, SpO2: 95 %   Physical Exam  Constitutional:       General: He is not in acute distress. Appearance: He is obese. Comments: BMI 45   HENT:      Head: Normocephalic and atraumatic.       Nose: Nose normal.      Mouth/Throat:      Mouth: Mucous membranes are moist.      Pharynx: Oropharynx is clear. Eyes:      Extraocular Movements: Extraocular movements intact. Conjunctiva/sclera: Conjunctivae normal.      Pupils: Pupils are equal, round, and reactive to light. Cardiovascular:      Rate and Rhythm: Normal rate and regular rhythm. Pulses: Normal pulses. Heart sounds: Normal heart sounds. No murmur heard. No friction rub. No gallop. Pulmonary:      Effort: Pulmonary effort is normal.      Breath sounds: Normal breath sounds. No wheezing, rhonchi or rales. Abdominal:      General: Bowel sounds are normal.      Palpations: Abdomen is soft. Tenderness: There is no abdominal tenderness. Genitourinary:     Penis: Circumcised. Discharge present. No erythema, tenderness, swelling or lesions. Testes: Normal. Cremasteric reflex is present. Right: Tenderness or swelling not present. Left: Tenderness or swelling not present. Epididymis:      Right: Normal.      Left: Normal.       Musculoskeletal:         General: Normal range of motion. Cervical back: Normal range of motion and neck supple. Right lower leg: No edema. Left lower leg: No edema. Skin:     General: Skin is warm and dry. Capillary Refill: Capillary refill takes less than 2 seconds. Neurological:      General: No focal deficit present. Mental Status: He is alert and oriented to person, place, and time. Mental status is at baseline. Psychiatric:         Mood and Affect: Mood normal.         Behavior: Behavior normal.         Thought Content: Thought content does not include homicidal or suicidal ideation. Thought content does not include homicidal or suicidal plan.          Diagnostic Results     EKG   NA    RADIOLOGY:  No orders to display       LABS:   Results for orders placed or performed during the hospital encounter of 01/25/22   POCT Glucose   Result Value Ref Range    POC Glucose 95 70 - 99 mg/dl Performed on ACCU-CHE        ED BEDSIDE ULTRASOUND:      RECENT VITALS:  BP: (!) 158/95, Temp: 97.6 °F (36.4 °C),Pulse: 60, Resp: 15, SpO2: 95 %     Procedures         ED Course     Nursing Notes, Past Medical Hx, Past Surgical Hx, Social Hx, Allergies, and FamilyHx were reviewed. The patient was giventhe following medications:  Orders Placed This Encounter   Medications    cefTRIAXone (ROCEPHIN) 500 mg in lidocaine 1 % 1 mL IM Injection     Order Specific Question:   Antimicrobial Indications     Answer:   STD infection    azithromycin (ZITHROMAX) tablet 1,000 mg     Order Specific Question:   Antimicrobial Indications     Answer:   STD infection       CONSULTS:  None    MEDICAL DECISION MAKING / ASSESSMENT / Pardeep Katelin is a 43 y.o. male with a past medical history of STDs who comes in with 1/2 weeks of whitish/light yellowish penile discharge after having unprotected sex. Denies dysuria like his previous STDs however states the discharge is similar. He states that he has been tested for HIV \"years ago \"and was negative however I cannot see this in the charts. I therefore sent for chlamydia, gonorrhea, RPR and HIV and I will personally follow-up with these results. Patient also states that he never checks his sugars and has previously been in the 500s and therefore I sent a hemoglobin A1c which can follow-up with in the clinic. Blood glucose here is 95. Additionally, patient is agreeable to making an appointment next week with the [facility]. internal medicine residency clinic for follow-up with his hemoglobin A1c results as well as his STD lab results. Patient was given 500 mg IM dose of ceftriaxone and 1000 mg azithromycin p.o. here in the ED given high likelihood of gonorrhea and/or chlamydia STI. Patient states that if he is positive for anything he will let his sexual partner know so that she can get treated as well.     This patient was also evaluated by the attending physician. All care plans were discussed and agreed upon. Clinical Impression     1. Abnormal penile discharge        Disposition     PATIENT REFERRED TO:  [address] [address] [address]   [address] [place] [address]  [phone]  Schedule an appointment as soon as possible for a visit in 1 week  For follow up with STD labs and HbA1c      DISCHARGE MEDICATIONS:  New Prescriptions    No medications on file       DISPOSITION Discharge - Pending Orders Complete 01/25/2022 10:14:08 AM       Kali Bailey MD  Resident  01/25/22 [id number]

## 2022-01-26 LAB
C. TRACHOMATIS DNA ,URINE: NEGATIVE
ESTIMATED AVERAGE GLUCOSE: 119.8 MG/DL
HBA1C MFR BLD: 5.8 %
HIV AG/AB: NORMAL
HIV ANTIGEN: NORMAL
HIV-1 ANTIBODY: NORMAL
HIV-2 AB: NORMAL
N. GONORRHOEAE DNA, URINE: NEGATIVE
RPR: NORMAL

## 2022-01-27 ENCOUNTER — TELEPHONE (OUTPATIENT)
Dept: INTERNAL MEDICINE CLINIC | Age: 37
End: 2022-01-27